# Patient Record
Sex: FEMALE | Race: WHITE | NOT HISPANIC OR LATINO | URBAN - NONMETROPOLITAN AREA
[De-identification: names, ages, dates, MRNs, and addresses within clinical notes are randomized per-mention and may not be internally consistent; named-entity substitution may affect disease eponyms.]

---

## 2019-12-13 ENCOUNTER — IMPORTED ENCOUNTER (OUTPATIENT)
Dept: URBAN - NONMETROPOLITAN AREA CLINIC 1 | Facility: CLINIC | Age: 79
End: 2019-12-13

## 2019-12-13 PROBLEM — H16.223: Noted: 2019-12-13

## 2019-12-13 PROBLEM — Z96.1: Noted: 2019-12-13

## 2019-12-13 PROBLEM — H26.493: Noted: 2019-12-13

## 2019-12-13 PROCEDURE — 92004 COMPRE OPH EXAM NEW PT 1/>: CPT

## 2019-12-13 PROCEDURE — 92015 DETERMINE REFRACTIVE STATE: CPT

## 2019-12-13 NOTE — PATIENT DISCUSSION
Pseudophakia w/mild PCO OU-  discussed findings w/patient-  no treatment indicated at this time-  continue to monitor yearly or prnDES-  discussed findings w/patient-  she has been taking drops only at night-  start AT's at least BID during the day and again at bedtime-  continue to monitor yearly or prnSimple Astigmatism OD/Compound Myopic Astigmatism OS w/Presbyopia-  discussed findings w/patient-  new spectacle Rx issued-  continue to monitor yearly or prn; 's Notes: MR 12/13/2019DFE 12/13/2019

## 2021-09-03 ENCOUNTER — IMPORTED ENCOUNTER (OUTPATIENT)
Dept: URBAN - NONMETROPOLITAN AREA CLINIC 1 | Facility: CLINIC | Age: 81
End: 2021-09-03

## 2021-09-03 PROCEDURE — 92015 DETERMINE REFRACTIVE STATE: CPT

## 2021-09-03 PROCEDURE — 92014 COMPRE OPH EXAM EST PT 1/>: CPT

## 2021-09-03 NOTE — PATIENT DISCUSSION
Pseudophakia w/mild PCO OU-  discussed findings w/patient-  no treatment indicated at this time-  continue to monitor yearly or prnDES-  discussed findings w/patient-  she has been taking drops only at night-  start AT's at least BID during the day and again at bedtime-  continue to monitor yearly or prnSimple Astigmatism OD/Compound Myopic Astigmatism OS w/Presbyopia-  discussed findings w/patient-  new spectacle Rx issued-  continue to monitor yearly or prn; 's Notes: MR 9/3/2021DFE 9/3/2021

## 2021-09-06 PROBLEM — H52.4: Noted: 2021-09-06

## 2021-09-06 PROBLEM — H26.493: Noted: 2019-12-13

## 2021-09-06 PROBLEM — H16.223: Noted: 2019-12-13

## 2021-09-06 PROBLEM — H52.223: Noted: 2021-09-06

## 2021-09-06 PROBLEM — H52.12: Noted: 2021-09-06

## 2021-09-06 PROBLEM — Z96.1: Noted: 2019-12-13

## 2022-04-15 ASSESSMENT — VISUAL ACUITY
OU_CC: 20/20
OD_CC: J2
OS_CC: J2
OS_CC: 20/25
OU_CC: J2
OD_CC: 20/20
OD_CC: 20/20-1
OS_CC: 20/20

## 2022-04-15 ASSESSMENT — TONOMETRY
OD_IOP_MMHG: 15
OD_IOP_MMHG: 15
OS_IOP_MMHG: 15
OS_IOP_MMHG: 16

## 2023-08-07 ENCOUNTER — FOLLOW UP (OUTPATIENT)
Dept: RURAL CLINIC 1 | Facility: CLINIC | Age: 83
End: 2023-08-07

## 2023-08-07 DIAGNOSIS — Z96.1: ICD-10-CM

## 2023-08-07 DIAGNOSIS — H26.493: ICD-10-CM

## 2023-08-07 DIAGNOSIS — H16.223: ICD-10-CM

## 2023-08-07 PROCEDURE — 92014 COMPRE OPH EXAM EST PT 1/>: CPT

## 2023-08-07 ASSESSMENT — TONOMETRY
OS_IOP_MMHG: 18
OD_IOP_MMHG: 18

## 2023-08-07 ASSESSMENT — VISUAL ACUITY
OS_SC: 20/20
OD_SC: 20/25-2
OD_PH: 20/20-2

## 2024-07-17 ENCOUNTER — EMERGENCY VISIT (OUTPATIENT)
Dept: URBAN - NONMETROPOLITAN AREA CLINIC 4 | Facility: CLINIC | Age: 84
End: 2024-07-17

## 2024-07-17 DIAGNOSIS — Z96.1: ICD-10-CM

## 2024-07-17 DIAGNOSIS — H35.54: ICD-10-CM

## 2024-07-17 DIAGNOSIS — H16.223: ICD-10-CM

## 2024-07-17 DIAGNOSIS — H01.132: ICD-10-CM

## 2024-07-17 DIAGNOSIS — H26.493: ICD-10-CM

## 2024-07-17 DIAGNOSIS — H01.135: ICD-10-CM

## 2024-07-17 DIAGNOSIS — H01.131: ICD-10-CM

## 2024-07-17 DIAGNOSIS — H01.134: ICD-10-CM

## 2024-07-17 PROCEDURE — 99214 OFFICE O/P EST MOD 30 MIN: CPT

## 2024-07-17 ASSESSMENT — VISUAL ACUITY
OS_SC: 20/25-1
OU_SC: 20/20
OD_SC: 20/25

## 2024-07-17 ASSESSMENT — TONOMETRY
OS_IOP_MMHG: 15
OD_IOP_MMHG: 15

## 2025-08-21 ENCOUNTER — COMPREHENSIVE EXAM (OUTPATIENT)
Age: 85
End: 2025-08-21

## 2025-08-21 DIAGNOSIS — H16.223: ICD-10-CM

## 2025-08-21 DIAGNOSIS — D48.7: ICD-10-CM

## 2025-08-21 DIAGNOSIS — H01.131: ICD-10-CM

## 2025-08-21 DIAGNOSIS — H01.132: ICD-10-CM

## 2025-08-21 DIAGNOSIS — Z96.1: ICD-10-CM

## 2025-08-21 DIAGNOSIS — H01.134: ICD-10-CM

## 2025-08-21 DIAGNOSIS — H26.493: ICD-10-CM

## 2025-08-21 DIAGNOSIS — H01.135: ICD-10-CM

## 2025-08-21 PROCEDURE — 99214 OFFICE O/P EST MOD 30 MIN: CPT

## 2025-08-21 PROCEDURE — 92134 CPTRZ OPH DX IMG PST SGM RTA: CPT
